# Patient Record
Sex: FEMALE | Race: BLACK OR AFRICAN AMERICAN | NOT HISPANIC OR LATINO | Employment: FULL TIME | URBAN - METROPOLITAN AREA
[De-identification: names, ages, dates, MRNs, and addresses within clinical notes are randomized per-mention and may not be internally consistent; named-entity substitution may affect disease eponyms.]

---

## 2017-08-11 ENCOUNTER — HISTORICAL (OUTPATIENT)
Dept: RADIOLOGY | Facility: HOSPITAL | Age: 67
End: 2017-08-11

## 2018-12-24 ENCOUNTER — HISTORICAL (OUTPATIENT)
Dept: RADIOLOGY | Facility: HOSPITAL | Age: 68
End: 2018-12-24

## 2019-09-09 ENCOUNTER — HISTORICAL (OUTPATIENT)
Dept: ADMINISTRATIVE | Facility: HOSPITAL | Age: 69
End: 2019-09-09

## 2019-09-09 LAB
BUN SERPL-MCNC: 12 MG/DL (ref 8–27)
CALCIUM SERPL-MCNC: 10 MG/DL (ref 8.7–10.3)
CHLORIDE SERPL-SCNC: 102 MMOL/L (ref 96–106)
CO2 SERPL-SCNC: 25 MMOL/L (ref 20–29)
CREAT SERPL-MCNC: 0.68 MG/DL (ref 0.57–1)
POTASSIUM SERPL-SCNC: 4.5 MMOL/L (ref 3.5–5.2)
SODIUM SERPL-SCNC: 141 MMOL/L (ref 134–144)

## 2020-02-05 ENCOUNTER — HISTORICAL (OUTPATIENT)
Dept: ADMINISTRATIVE | Facility: HOSPITAL | Age: 70
End: 2020-02-05

## 2020-02-05 LAB
ALBUMIN SERPL-MCNC: 4.3 G/DL (ref 3.8–4.8)
ALBUMIN/GLOB SERPL: 1.3 {RATIO} (ref 1.2–2.2)
ALP SERPL-CCNC: 82 IU/L (ref 39–117)
ALT SERPL-CCNC: 12 IU/L (ref 0–32)
AST SERPL-CCNC: 14 IU/L (ref 0–40)
BILIRUB SERPL-MCNC: 0.2 MG/DL (ref 0–1.2)
BILIRUB SERPL-MCNC: NEGATIVE MG/DL
BLOOD URINE, POC: NEGATIVE
BUN SERPL-MCNC: 11 MG/DL (ref 8–27)
CALCIUM SERPL-MCNC: 9.5 MG/DL (ref 8.7–10.3)
CHLORIDE SERPL-SCNC: 102 MMOL/L (ref 96–106)
CHOLEST SERPL-MCNC: 112 MG/DL (ref 100–199)
CHOLEST/HDLC SERPL: 1.9 RATIO (ref 0–4.4)
CLARITY, POC UA: CLEAR
CO2 SERPL-SCNC: 21 MMOL/L (ref 20–29)
COLOR, POC UA: YELLOW
CREAT SERPL-MCNC: 0.64 MG/DL (ref 0.57–1)
CREAT/UREA NIT SERPL: 17 (ref 12–28)
GLOBULIN SER-MCNC: 3.2 G/DL (ref 1.5–4.5)
GLUCOSE SERPL-MCNC: 182 MG/DL (ref 65–99)
GLUCOSE UR QL STRIP: NORMAL
HDLC SERPL-MCNC: 60 MG/DL
KETONES UR QL STRIP: NEGATIVE
LDLC SERPL CALC-MCNC: 40 MG/DL (ref 0–99)
LEUKOCYTE EST, POC UA: NEGATIVE
MICROALBUMIN/CREAT RATIO PNL UR: 36 MG/G CREAT (ref 0–29)
NITRITE, POC UA: NEGATIVE
PH, POC UA: 6
POTASSIUM SERPL-SCNC: 4.2 MMOL/L (ref 3.5–5.2)
PROT SERPL-MCNC: 7.5 G/DL (ref 6–8.5)
PROTEIN, POC: NEGATIVE
SODIUM SERPL-SCNC: 141 MMOL/L (ref 134–144)
SPECIFIC GRAVITY, POC UA: 1
TRIGL SERPL-MCNC: 59 MG/DL (ref 0–149)
UROBILINOGEN, POC UA: NORMAL
VLDLC SERPL CALC-MCNC: 12 MG/DL (ref 5–40)

## 2021-06-01 ENCOUNTER — HISTORICAL (OUTPATIENT)
Dept: RADIOLOGY | Facility: HOSPITAL | Age: 71
End: 2021-06-01

## 2022-04-09 ENCOUNTER — HISTORICAL (OUTPATIENT)
Dept: ADMINISTRATIVE | Facility: HOSPITAL | Age: 72
End: 2022-04-09

## 2022-04-29 VITALS
OXYGEN SATURATION: 99 % | DIASTOLIC BLOOD PRESSURE: 65 MMHG | SYSTOLIC BLOOD PRESSURE: 111 MMHG | HEIGHT: 64 IN | WEIGHT: 180.13 LBS | BODY MASS INDEX: 30.75 KG/M2

## 2022-05-02 NOTE — HISTORICAL [facility] CERNER
This is a historical note converted from Jacqueline. Formatting and pictures may have been removed.  Please reference Cerzhanna for original formatting and attached multimedia. Chief Complaint  3 month revisit  History of Present Illness  This is a 69-year-old white female who presents to clinic today for?3-month follow-up. ?Patient has a history of type 2 diabetes, hyperlipidemia, hypertension, SVT, thyroid disease.? Patient states she was diagnosed with gallstones about 1 or 2 years ago. ?States that?she has seen  treated twice for this problem. ?States that?she does have pain?most days but as long as she modifies her diet it is not bad.? Patient states that she is not sure when she should have her gallbladder taken out.  Review of Systems  Constitutional: Negative except as documented in history of present illness.?  Eye: Negative except as documented in history of present illness.  Ear/Nose/Mouth/Throat: Negative except as documented in history of present illness.  Respiratory: Negative except as documented in history of present illness.  Cardiovascular: Negative except as documented in history of present illness.  Breast: Negative.  Gastrointestinal: Negative except as documented in history of present illness.  Genitourinary: Negative except as documented in history of present illness.  Gynecologic: Negative, Negative except as documented in history of present illness.  Hematology/Lymphatics: Negative except as documented in history of present illness.  Endocrine: Negative except as documented in history of present illness.  Immunologic: Negative except as documented in history of present illness.  Musculoskeletal: Negative except as documented in history of present illness.  Integumentary: Negative except as documented in history of present illness.  Neurologic: Negative except as documented in history of present illness.  Psychiatric: Negative except as documented in history of present illness.  All  other systems are negative  ?  Physical Exam  Vitals & Measurements  HR:?74(Peripheral)? BP:?111/65? SpO2:?99%?  HT:?163?cm? WT:?81.7?kg? BMI:?30.75?  General: Alert and oriented, No acute distress.?  Eye: Pupils are equal, round and reactive to light, Extraocular movements are intact, Normal conjunctiva.  HENT: Normocephalic, No damage to dentition, Tympanic membranes are clear, Good light reflex, Normal hearing, Oral mucosa is moist, No pharyngeal erythema, No sinus tenderness.  Neck: Supple, Non-tender.  Respiratory: Lungs are clear to auscultation, Respirations are non-labored, Breath sounds are equal, Symmetrical chest wall expansion.  Cardiovascular: Normal rate, Regular rhythm, No murmur, No edema.  Gastrointestinal: Soft, Non-tender, Non-distended, Normal bowel sounds.  Musculoskeletal: Normal range of motion, Normal strength, No tenderness, No swelling, No deformity, Normal gait.  Integumentary: Warm, Dry, Pink.  Feet: See separate foot exam documentation.  Neurologic: Alert, Oriented, Normal sensory, Normal motor function, No focal deficits.  Cognition and Speech: Oriented, Speech clear and coherent, Functional cognition intact.  Psychiatric: Cooperative, Appropriate mood & affect, Normal judgment, Non-suicidal.  ?  Assessment/Plan  1.?Type 2 diabetes mellitus?E11.9  ?Hemoglobin A1c up to 8.4, patient has not been taking her Januvia.? Patient is also?drinking?ginger ale daily as well as?regular Gatorade.? Discussed at length with patient?that these things are full of sugars. ?I did give her handouts on?diabetic meal planning and portion sizes.? She is also not checking her sugars. ?I will send in prescription for?diabetic testing supplies.? Return to clinic 3 months?for recheck.  Ordered:  CBC w/ Auto Diff, Routine collect, *Est. 08/05/20 3:00:00 CDT, Blood, Order for future visit, *Est. Stop date 08/05/20 3:00:00 CDT, Lab Collect, Type 2 diabetes mellitus  Hyperlipidemia  Hypertension, 02/05/20 8:02:00  CST  Comprehensive Metabolic Panel, Routine collect, 02/05/20 8:02:00 CST, Blood, [facility], Stop date 02/05/20 8:02:00 CST, Lab Collect, Type 2 diabetes mellitus  Hyperlipidemia  Hypertension, 02/05/20 8:02:00 CST  Comprehensive Metabolic Panel, Routine collect, *Est. 08/05/20 3:00:00 CDT, Blood, Order for future visit, *Est. Stop date 08/05/20 3:00:00 CDT, Lab Collect, Type 2 diabetes mellitus  Hyperlipidemia  Hypertension, 02/05/20 8:02:00 CST  Hemoglobin A1c, Routine collect, *Est. 08/05/20 3:00:00 CDT, Blood, Order for future visit, *Est. Stop date 08/05/20 3:00:00 CDT, Lab Collect, Type 2 diabetes mellitus, 02/05/20 8:02:00 CST  Microalbum/Creatinine Ratio Urine (Microalb/Creat), Routine collect, Urine, [facility], 02/05/20 8:02:00 CST, Stop date 02/05/20 8:02:00 CST, Nurse collect, Type 2 diabetes mellitus  Office/Outpatient Visit Level 4 Established [id number] PC, Type 2 diabetes mellitus  Hyperlipidemia  Hypertension  SVT (supraventricular tachycardia)  Thyroid disease  Post-menopause  Cholelithiasis, [facility], 02/05/20 9:04:00 CST  ?  2.?Hyperlipidemia?E78.5  ?Lipid panel today, will call with results.  Ordered:  CBC w/ Auto Diff, Routine collect, *Est. 08/05/20 3:00:00 CDT, Blood, Order for future visit, *Est. Stop date 08/05/20 3:00:00 CDT, Lab Collect, Type 2 diabetes mellitus  Hyperlipidemia  Hypertension, 02/05/20 8:02:00 CST  Comprehensive Metabolic Panel, Routine collect, 02/05/20 8:02:00 CST, Blood, [facility], Stop date 02/05/20 8:02:00 CST, Lab Collect, Type 2 diabetes mellitus  Hyperlipidemia  Hypertension, 02/05/20 8:02:00 CST  Comprehensive Metabolic Panel, Routine collect, *Est. 08/05/20 3:00:00 CDT, Blood, Order for future visit, *Est. Stop date 08/05/20 3:00:00 CDT, Lab Collect, Type 2 diabetes mellitus  Hyperlipidemia  Hypertension, 02/05/20 8:02:00 CST  Lipid Panel, Routine collect, 02/05/20 8:02:00 CST, Blood, [facility], Stop  date 02/05/20 8:02:00 CST, Lab Collect, Hyperlipidemia, 02/05/20 8:02:00 CST  Lipid Panel, Routine collect, *Est. 08/05/20 3:00:00 CDT, Blood, Order for future visit, *Est. Stop date 08/05/20 3:00:00 CDT, Lab Collect, Hyperlipidemia, 02/05/20 8:02:00 CST  Office/Outpatient Visit Level 4 Established [id number] PC, Type 2 diabetes mellitus  Hyperlipidemia  Hypertension  SVT (supraventricular tachycardia)  Thyroid disease  Post-menopause  Cholelithiasis, [facility], 02/05/20 9:04:00 CST  ?  3.?Hypertension?I10  Stable, continue current meds, follow-up 6 months.  Ordered:  CBC w/ Auto Diff, Routine collect, *Est. 08/05/20 3:00:00 CDT, Blood, Order for future visit, *Est. Stop date 08/05/20 3:00:00 CDT, Lab Collect, Type 2 diabetes mellitus  Hyperlipidemia  Hypertension, 02/05/20 8:02:00 CST  Comprehensive Metabolic Panel, Routine collect, 02/05/20 8:02:00 CST, Blood, [facility], Stop date 02/05/20 8:02:00 CST, Lab Collect, Type 2 diabetes mellitus  Hyperlipidemia  Hypertension, 02/05/20 8:02:00 CST  Comprehensive Metabolic Panel, Routine collect, *Est. 08/05/20 3:00:00 CDT, Blood, Order for future visit, *Est. Stop date 08/05/20 3:00:00 CDT, Lab Collect, Type 2 diabetes mellitus  Hyperlipidemia  Hypertension, 02/05/20 8:02:00 CST  Office/Outpatient Visit Level 4 Established [id number] PC, Type 2 diabetes mellitus  Hyperlipidemia  Hypertension  SVT (supraventricular tachycardia)  Thyroid disease  Post-menopause  Cholelithiasis, [facility], 02/05/20 9:04:00 CST  ?  4.?SVT (supraventricular tachycardia)?I47.1  Ordered:  Office/Outpatient Visit Level 4 Established [id number] PC, Type 2 diabetes mellitus  Hyperlipidemia  Hypertension  SVT (supraventricular tachycardia)  Thyroid disease  Post-menopause  Cholelithiasis, [facility], 02/05/20 9:04:00 CST  ?  5.?Thyroid disease?E07.9  ?TSH today, will call with  results.  Ordered:  Office/Outpatient Visit Level 4 Established [id number] PC, Type 2 diabetes mellitus  Hyperlipidemia  Hypertension  SVT (supraventricular tachycardia)  Thyroid disease  Post-menopause  Cholelithiasis, [facility], 20 9:04:00 CST  ?  6.?Post-menopause?Z78.0  ?Bone density results requested from Dr Samaniego  Ordered:  Office/Outpatient Visit Level 4 Established [id number] PC, Type 2 diabetes mellitus  Hyperlipidemia  Hypertension  SVT (supraventricular tachycardia)  Thyroid disease  Post-menopause  Cholelithiasis, [facility], 20 9:04:00 CST  Request Bone Density Scan Results, 20 8:44:00 CST, Dr Michelle Samaniego, Post-menopause  ?  7.?Cholelithiasis?K80.20  US gallbladder requested from Dr Samaniego. ?Explained to patient that she cannot have elective gallbladder surgery right now due to her uncontrolled diabetes.? Once we get her sugars under control she can go back to Dr. Cruz for?surgery.  Ordered:  Office/Outpatient Visit Level 4 Established [id number] PC, Type 2 diabetes mellitus  Hyperlipidemia  Hypertension  SVT (supraventricular tachycardia)  Thyroid disease  Post-menopause  Cholelithiasis, [facility], 20 9:04:00 CST  ?   Problem List/Past Medical History  Ongoing  Hyperlipidemia  Hypertension  SVT (supraventricular tachycardia)  Thyroid disease  Type 2 diabetes mellitus  Historical  No qualifying data  Procedure/Surgical History  Colonoscopy through stoma; with biopsy, single or multiple ()   delivery only; ()   Medications  aspirin 81 mg oral tablet, 81 mg= 1 tab(s), Oral, Daily  digoxin 125 mcg (0.125 mg) oral tablet, 125 mcg= 1 tab(s), Oral, Daily, 5 refills  glimepiride 4 mg oral tablet, 4 mg= 1 tab(s), Oral, Daily, 5 refills  glucometer, See Instructions  glucose test strips, See Instructions, 11 refills  Januvia 100 mg oral tablet, 100 mg= 1 tab(s), Oral, Daily, 5 refills  Jardiance  25 mg oral tablet, 25 mg= 1 tab(s), Oral, qAM, 5 refills  Lancets, See Instructions, 11 refills  latanoprost 0.005% ophthalmic solution, 1 drop(s), Eye-Both, qPM  levothyroxine 100 mcg (0.1 mg) oral tablet, 100 mcg= 1 tab(s), Oral, Daily  lisinopril 10 mg oral tablet, 10 mg= 1 tab(s), Oral, Daily, 5 refills  metFORMIN 1000 mg oral tablet, See Instructions, 5 refills  nateglinide 120 mg oral tablet, 120 mg= 1 tab(s), Oral, TIDAC, 5 refills  omega-3 polyunsaturated fatty acids (Fish Oil 1000mg Cap), Oral  rosuvastatin 40 mg oral tablet, 40 mg= 1 tab(s), Oral, Daily, 5 refills  verapamil 180 mg/12 hours oral tablet, extended release, 360 mg= 2 tab(s), Oral, qAM, 5 refills  Vitamin D3 5000 intl units oral tablet, 5000 IntUnit= 1 tab(s), Oral, Daily, 5 refills  Allergies  No Known Medication Allergies  Social History  Abuse/Neglect  No, No, Yes, 02/05/2020  No, 09/09/2019  Tobacco  Never (less than 100 in lifetime), N/A, 02/05/2020  Family History  CABG - Coronary artery bypass graft: Mother.  CAD - Coronary artery disease: Mother and Sister.  Cancer: Father.  Diabetes mellitus type 2: Mother and Sister.  Health Maintenance  Health Maintenance  ???Pending?(in the next year)  ??? ??OverDue  ??? ? ? ?Diabetes Maintenance-Fasting Lipid Profile due??10/16/15??and every 1??year(s)  ??? ??Due?  ??? ? ? ?Diabetes Maintenance-Urine Dipstick due??02/05/20??Variable frequency  ??? ? ? ?Pneumococcal Vaccine due??02/05/20??and every?  ??? ? ? ?Smoking Cessation (Diabetes) due??02/05/20??and every?  ??? ??Refused?  ??? ? ? ?Pneumococcal Vaccine due??02/05/20??Variable frequency  ??? ? ? ?Tetanus Vaccine due??02/05/20??and every 10??year(s)  ??? ? ? ?Zoster Vaccine due??02/05/20??and every 100??year(s)  ??? ??Due In Future?  ??? ? ? ?Bone Density Screening not due until??02/06/20??and every 1??day(s)  ??? ? ? ?Diabetes Maintenance-Eye Exam not due until??08/18/20??and every 1??year(s)  ??? ? ? ?Hypertension Management-BMP not due  until??09/08/20??and every 1??year(s)  ??? ? ? ?ADL Screening not due until??09/09/20??and every 1??year(s)  ??? ? ? ?Aspirin Therapy for CVD Prevention not due until??09/09/20??and every 1??year(s)  ??? ? ? ?Diabetes Maintenance-Serum Creatinine not due until??09/09/20??and every 1??year(s)  ??? ? ? ?Hypertension Management-Education not due until??09/09/20??and every 1??year(s)  ??? ? ? ?Breast Cancer Screening (Senior Wellness) not due until??12/23/20??and every 2??year(s)  ??? ? ? ?Advance Directive not due until??01/01/21??and every 1??year(s)  ??? ? ? ?Alcohol Misuse Screening not due until??01/01/21??and every 1??year(s)  ??? ? ? ?Cognitive Screening not due until??01/01/21??and every 1??year(s)  ??? ? ? ?Fall Risk Assessment not due until??01/01/21??and every 1??year(s)  ??? ? ? ?Functional Assessment not due until??01/01/21??and every 1??year(s)  ??? ? ? ?Geriatric Depression Screening not due until??01/01/21??and every 1??year(s)  ??? ? ? ?Obesity Screening not due until??01/01/21??and every 1??year(s)  ??? ? ? ?Diabetes Maintenance-Foot Exam not due until??02/04/21??and every 1??year(s)  ??? ? ? ?Diabetes Maintenance-HgbA1c not due until??02/04/21??and every 1??year(s)  ??? ? ? ?Hypertension Management-Blood Pressure not due until??02/04/21??and every 1??year(s)  ???Satisfied?(in the past 1 year)  ??? ??Satisfied?  ??? ? ? ?ADL Screening on??09/09/19.??Satisfied by Neva Lynn LPN  ??? ? ? ?Advance Directive on??02/05/20.??Satisfied by Neva Lynn LPN  ??? ? ? ?Alcohol Misuse Screening on??02/05/20.??Satisfied by Mihir Lynn LPNle  ??? ? ? ?Aspirin Therapy for CVD Prevention on??09/09/19.??Satisfied by Glendy Morin  ??? ? ? ?Blood Pressure Screening on??02/05/20.??Satisfied by Neva Lynn LPN  ??? ? ? ?Body Mass Index Check on??02/05/20.??Satisfied by Neva Lynn LPN  ??? ? ? ?Bone Density Screening on??02/05/20.??Satisfied by Glendy Morin  ??? ? ?  ?Cognitive Screening on??02/05/20.??Satisfied by Mihir Lynn LPNle  ??? ? ? ?Depression Screening on??02/05/20.??Satisfied by Leah RITTER Neva  ??? ? ? ?Diabetes Maintenance-Foot Exam on??02/05/20.??Satisfied by Glendy Morin  ??? ? ? ?Fall Risk Assessment on??02/05/20.??Satisfied by Leah RITTER Neva  ??? ? ? ?Functional Assessment on??02/05/20.??Satisfied by Leah RITTER, Neva  ??? ? ? ?Geriatric Depression Screening on??02/05/20.??Satisfied by Leah RITTER Neva  ??? ? ? ?Hypertension Management-Blood Pressure on??02/05/20.??Satisfied by Leah RITTER Neva  ??? ? ? ?Obesity Screening on??02/05/20.??Satisfied by Neva Lynn LPN  ??? ? ? ?Smoking Cessation (Diabetes) on??02/05/20.??Satisfied by Glendy Morin  ??? ??Refused?  ??? ? ? ?Pneumococcal Vaccine (Senior Wellness) on??02/05/20.??Recorded by Glendy Morin  ??? ? ? ?Tetanus Vaccine on??02/05/20.??Recorded by Glendy Morin  ??? ? ? ?Zoster Vaccine on??02/05/20.??Recorded by Glendy Morin  ?  Lab Results  Test Name Test Result Date/Time   Hgb A1C POC 8.4 02/05/2020 08:02 CST

## 2022-12-09 DIAGNOSIS — Z12.31 ENCOUNTER FOR SCREENING MAMMOGRAM FOR MALIGNANT NEOPLASM OF BREAST: Primary | ICD-10-CM

## 2023-01-03 ENCOUNTER — HOSPITAL ENCOUNTER (OUTPATIENT)
Dept: RADIOLOGY | Facility: HOSPITAL | Age: 73
Discharge: HOME | End: 2023-01-03
Attending: INTERNAL MEDICINE
Payer: COMMERCIAL

## 2023-01-03 DIAGNOSIS — Z12.31 ENCOUNTER FOR SCREENING MAMMOGRAM FOR MALIGNANT NEOPLASM OF BREAST: ICD-10-CM

## 2023-01-03 PROCEDURE — 77067 MAMMO DIGITAL SCREENING BILAT WITH TOMO: ICD-10-PCS | Mod: 26,,, | Performed by: RADIOLOGY

## 2023-01-03 PROCEDURE — 77067 SCR MAMMO BI INCL CAD: CPT | Mod: TC

## 2023-01-03 PROCEDURE — 77063 MAMMO DIGITAL SCREENING BILAT WITH TOMO: ICD-10-PCS | Mod: 26,,, | Performed by: RADIOLOGY

## 2023-01-03 PROCEDURE — 77063 BREAST TOMOSYNTHESIS BI: CPT | Mod: 26,,, | Performed by: RADIOLOGY

## 2023-01-03 PROCEDURE — 77067 SCR MAMMO BI INCL CAD: CPT | Mod: 26,,, | Performed by: RADIOLOGY

## 2023-12-19 DIAGNOSIS — N95.9 UNSPECIFIED MENOPAUSAL AND PERIMENOPAUSAL DISORDER: ICD-10-CM

## 2023-12-19 DIAGNOSIS — Z12.31 ENCOUNTER FOR SCREENING MAMMOGRAM FOR MALIGNANT NEOPLASM OF BREAST: Primary | ICD-10-CM

## 2024-01-12 ENCOUNTER — HOSPITAL ENCOUNTER (OUTPATIENT)
Dept: RADIOLOGY | Facility: HOSPITAL | Age: 74
Discharge: HOME | End: 2024-01-12
Attending: INTERNAL MEDICINE
Payer: COMMERCIAL

## 2024-01-12 DIAGNOSIS — N95.9 UNSPECIFIED MENOPAUSAL AND PERIMENOPAUSAL DISORDER: ICD-10-CM

## 2024-01-12 DIAGNOSIS — Z12.31 ENCOUNTER FOR SCREENING MAMMOGRAM FOR MALIGNANT NEOPLASM OF BREAST: ICD-10-CM

## 2024-01-12 PROCEDURE — 77067 SCR MAMMO BI INCL CAD: CPT | Mod: TC

## 2024-01-12 PROCEDURE — 77080 DXA BONE DENSITY AXIAL: CPT | Mod: TC

## 2024-01-12 PROCEDURE — 77081 DXA BONE DENSITY APPENDICULR: CPT | Mod: 59,TC

## 2024-01-12 PROCEDURE — 77067 SCR MAMMO BI INCL CAD: CPT | Mod: 26,,, | Performed by: STUDENT IN AN ORGANIZED HEALTH CARE EDUCATION/TRAINING PROGRAM

## 2024-01-12 PROCEDURE — 77063 BREAST TOMOSYNTHESIS BI: CPT | Mod: 26,,, | Performed by: STUDENT IN AN ORGANIZED HEALTH CARE EDUCATION/TRAINING PROGRAM
